# Patient Record
Sex: MALE | Race: WHITE | Employment: PART TIME | URBAN - METROPOLITAN AREA
[De-identification: names, ages, dates, MRNs, and addresses within clinical notes are randomized per-mention and may not be internally consistent; named-entity substitution may affect disease eponyms.]

---

## 2017-02-08 ENCOUNTER — HOSPITAL ENCOUNTER (OUTPATIENT)
Dept: LAB | Age: 22
Discharge: HOME | End: 2017-02-08

## 2017-02-08 LAB
HBV & HDV AB SER-IMP: NEGATIVE
HBV SURFACE AB SERPL IA-ACNC: <3.1 MIU/ML
HEP BS AB COMMENT,HBSAC: ABNORMAL
RUBV IGG SER-IMP: NORMAL
T-SPOT TB TEST (EMPLOYEE),XTBE: NORMAL

## 2017-02-08 PROCEDURE — 86762 RUBELLA ANTIBODY: CPT | Performed by: EMERGENCY MEDICINE

## 2017-02-08 PROCEDURE — 36415 COLL VENOUS BLD VENIPUNCTURE: CPT | Performed by: EMERGENCY MEDICINE

## 2017-02-08 PROCEDURE — 86787 VARICELLA-ZOSTER ANTIBODY: CPT | Performed by: EMERGENCY MEDICINE

## 2017-02-08 PROCEDURE — 86706 HEP B SURFACE ANTIBODY: CPT | Performed by: EMERGENCY MEDICINE

## 2017-02-08 PROCEDURE — 86765 RUBEOLA ANTIBODY: CPT | Performed by: EMERGENCY MEDICINE

## 2017-02-08 PROCEDURE — 86735 MUMPS ANTIBODY: CPT | Performed by: EMERGENCY MEDICINE

## 2017-02-09 LAB
MEV IGG SER IA-ACNC: 267 AU/ML
MUV IGG SER IA-ACNC: 66.1 AU/ML
VZV IGG SER IA-ACNC: 139 INDEX

## 2020-05-30 ENCOUNTER — HOSPITAL ENCOUNTER (EMERGENCY)
Age: 25
Discharge: HOME | End: 2020-05-30
Attending: EMERGENCY MEDICINE
Payer: COMMERCIAL

## 2020-05-30 ENCOUNTER — NURSE TRIAGE (OUTPATIENT)
Dept: OTHER | Facility: CLINIC | Age: 25
End: 2020-05-30

## 2020-05-30 VITALS
OXYGEN SATURATION: 97 % | HEIGHT: 71 IN | TEMPERATURE: 98.5 F | DIASTOLIC BLOOD PRESSURE: 91 MMHG | BODY MASS INDEX: 23.8 KG/M2 | HEART RATE: 89 BPM | RESPIRATION RATE: 16 BRPM | WEIGHT: 170 LBS | SYSTOLIC BLOOD PRESSURE: 149 MMHG

## 2020-05-30 DIAGNOSIS — H72.91 PERFORATED EAR DRUM, RIGHT: Primary | ICD-10-CM

## 2020-05-30 PROCEDURE — 99282 EMERGENCY DEPT VISIT SF MDM: CPT

## 2020-05-30 RX ORDER — OFLOXACIN 3 MG/ML
5 SOLUTION AURICULAR (OTIC) 2 TIMES DAILY
Qty: 5 ML | Refills: 0 | Status: SHIPPED | OUTPATIENT
Start: 2020-05-30 | End: 2020-06-04

## 2020-05-30 RX ORDER — TRAMADOL HYDROCHLORIDE 50 MG/1
50 TABLET ORAL
Qty: 20 TAB | Refills: 0 | Status: SHIPPED | OUTPATIENT
Start: 2020-05-30 | End: 2020-06-06

## 2020-05-30 NOTE — LETTER
NOTIFICATION RETURN TO WORK / SCHOOL 
 
5/30/2020 10:40 PM 
 
Mr. Jose M Young [address] [facility] [id number] To Whom It May Concern: 
 
Jose M Young is currently under the care of [facility] EMERGENCY DEPT. He will return to work/school on: 6/3/2020 unless he feels well earlier Jose M Young may return to work/school with the following restrictions: Cannot wear hearing protection If there are questions or concerns please have the patient contact our office. Sincerely, Liang Pompa MD

## 2020-05-31 NOTE — DISCHARGE INSTRUCTIONS
Patient Education        Perforated Eardrum: Care Instructions  Your Care Instructions     A tear or hole in the membrane of the middle ear is called a perforated or ruptured eardrum. This can happen if an infection builds up inside the ear or if the eardrum gets injured. You may find it hard to hear out of that ear or may hear a buzzing sound. You may have an earache or have fluids that drain from the ear. Your eardrum should heal on its own in a few weeks, and you should hear normally then. If you have an infection, your doctor may prescribe antibiotics. You may need pain relief medicine for your earache. Your doctor will check to see if your eardrum has healed. If not, you may need surgery to repair the eardrum. Follow-up care is a key part of your treatment and safety. Be sure to make and go to all appointments, and call your doctor if you are having problems. It's also a good idea to know your test results and keep a list of the medicines you take. How can you care for yourself at home? · If your doctor prescribed antibiotics, take them as directed. Do not stop taking them just because you feel better. You need to take the full course of antibiotics. · Take an over-the-counter pain medicine, such as acetaminophen (Tylenol), ibuprofen (Advil, Motrin), or naproxen (Aleve), as needed. Read and follow all instructions on the label. · Do not take two or more pain medicines at the same time unless the doctor told you to. Many pain medicines have acetaminophen, which is Tylenol. Too much acetaminophen (Tylenol) can be harmful. · To ease pain, put a warm washcloth or a heating pad set on low on your ear. You may have some drainage from the ear. · Be careful when taking over-the-counter cold or flu medicines and Tylenol at the same time. Many of these medicines have acetaminophen, which is Tylenol. Read the labels to make sure that you are not taking more than the recommended dose.  Too much Tylenol can be harmful. · Keep your ears dry. ? Take baths until your doctor says you can take showers again. ? When you wash your hair, use cotton lightly coated with petroleum jelly as an earplug. Do not use plastic earplugs. ? Do not swim until your doctor says you can.  ? If you get water in your ears, turn your head to each side and pull the earlobe in different directions. This will help the water run out. If your ears are still wet, use a hair dryer set on the lowest heat. Hold the dryer several inches from your ear. · Do not put anything into your ear canal. For example, do not use a cotton swab to clean the inside of your ear. It can damage your ear. If you think you have something inside your ear, ask your doctor to check it. When should you call for help? Call your doctor now or seek immediate medical care if:  · You have signs of infection, such as:  ? Increased pain, swelling, warmth, or redness. ? Pus draining from the ear. ? A fever. Watch closely for changes in your health, and be sure to contact your doctor if:  · You have changes in hearing. · You do not get better as expected. Where can you learn more? Go to http://jeny-lilly.info/  Enter O822 in the search box to learn more about \"Perforated Eardrum: Care Instructions. \"  Current as of: July 29, 2019               Content Version: 12.5  © 2445-2231 [employer]. Care instructions adapted under license by [employer] (which disclaims liability or warranty for this information). If you have questions about a medical condition or this instruction, always ask your healthcare professional. [place] [address] any warranty or liability for your use of this information.

## 2020-05-31 NOTE — TELEPHONE ENCOUNTER
Was swimming in the deep end of a pool, suddenly felt a tearing pain to ear, 2-3/10 initially, now 8/10 with no hearing in R ear. Dispo for Go to ED due to severe pain and total loss of hearing in the Right ear. Reason for Disposition   Sounds like a serious injury to the triager    Answer Assessment - Initial Assessment Questions  1. MECHANISM: \"How did the injury happen? \"       Swimming in deep end of swimming pool  2. ONSET: \"When did the injury happen? \" (Minutes or hours ago)       30 mins ago  3. LOCATION: \"What part of the ear is injured? \"       Right inner ear  4. APPEARANCE: \"What does the ear look like? \"       Normal external appearance  5. HEARING: \"Was the hearing damaged? \"       yes  6. SIZE: For cuts, bruises, or swelling, ask: \"How large is it? \" (e.g., inches or centimeters)      no  7. PAIN: \"Is it painful? \" If so, ask: \"How bad is the pain? \"    (e.g., Scale 1-10; or mild, moderate, severe)      severe  8. TETANUS: For any breaks in the skin, ask: \"When was the last tetanus booster? \"      3 years ago  9. OTHER SYMPTOMS: \"Do you have any other symptoms? \" (e.g., neck pain, headache, loss of consciousness)      Headache  10. PREGNANCY: \"Is there any chance you are pregnant? \" \"When was your last menstrual period? \"        male    Protocols used: EAR INJURY-ADULT-

## 2020-05-31 NOTE — ED NOTES
Patient was swimming and felt pressure on right ear and then tearing sensation. After coming out of water patient felt dizzy. States pain is worse than any he has experienced.

## 2020-05-31 NOTE — ED PROVIDER NOTES
EMERGENCY DEPARTMENT HISTORY AND PHYSICAL EXAM      Date: 5/30/2020  Patient Name: Bella Barron    History of Presenting Illness     Chief Complaint   Patient presents with    Ear Pain       History (Context): Bella Barron is a 22 y.o. previously healthy gentleman who presents with acute onset, persistent, severe, localized pain in the right ear experienced after diving in approximately 10 to 15 feet of water. On review of systems, the patient denies fever, chills, rashes,     PCP: Val León MD        Past History     Past Medical History:  History reviewed. No pertinent past medical history. Past Surgical History:  Past Surgical History:   Procedure Laterality Date    HX HEENT      HX TYMPANOSTOMY         Family History:  History reviewed. No pertinent family history. Social History:  Social History     Tobacco Use    Smoking status: Current Every Day Smoker    Smokeless tobacco: Never Used   Substance Use Topics    Alcohol use: Yes    Drug use: Never       Allergies:  No Known Allergies    PMH, PSH, family history, social history, allergies reviewed with the patient with significant items noted above. Review of Systems   As per HPI, otherwise reviewed and negative. Physical Exam     Vitals:    05/30/20 2222   BP: (!) 149/91   Pulse: 89   Resp: 16   Temp: 98.5 °F (36.9 °C)   SpO2: 97%   Weight: 77.1 kg (170 lb)   Height: 5' 11\" (1.803 m)       Gen: Well-appearing, in no acute distress   HEENT: Normocephalic, sclera anicteric, small perforation at 3 o'clock position in the right TM  Cardiovascular: Normal rate, regular rhythm, no murmurs, rubs, gallops. Pulses intact and equal distally. Pulmonary: No respiratory distress. No stridor. Clear lungs. ABD: Soft, nontender, nondistended. Neuro: Alert. Normal speech. Normal mentation. Psych: Normal thought content and thought processes. : No CVA tenderness  EXT: Moves all extremities well.   No cyanosis or clubbing. Skin: Warm and well-perfused. Other:        Diagnostic Study Results     Labs -   No results found for this or any previous visit (from the past 12 hour(s)). Radiologic Studies -   No orders to display     CT Results  (Last 48 hours)    None        CXR Results  (Last 48 hours)    None            Medical Decision Making   I am the first provider for this patient. I reviewed the vital signs, available nursing notes, past medical history, past surgical history, family history and social history. Vital Signs-Reviewed the patient's vital signs. EKG: Interpreted by myself. Records Reviewed: Personally, on initial evaluation    MDM:   Patient presents with right ear pain while diving. Exam significant for perforated TM. DDX considered: Perforated TM  DDX thought to be less likely but also considered due to high risk condition: Otitis externa, otitis media, labyrinthitis    Patient condition on initial evaluation: Stable    Plan:   Pain Control      Orders as below:  Orders Placed This Encounter    ofloxacin (FLOXIN) 0.3 % otic solution    traMADoL (Ultram) 50 mg tablet        ED Course:          Patient condition at time of disposition: Stable  DISCHARGE NOTE:   Pt has been reexamined. Patient has no new complaints, changes, or physical findings. Care plan outlined and precautions discussed. Results were reviewed with the patient. All medications were reviewed with the patient; will d/c home with antibiotic drops. All of pt's questions and concerns were addressed. Alarm symptoms and return precautions associated with chief complaint and evaluation were reviewed with the patient in detail. The patient demonstrated adequate understanding. Patient was instructed and agrees to follow up with PCP, as well as to return to the ED upon further deterioration. Patient is ready to go home.   The patient is happy with this plan    Follow-up Information     Follow up With Specialties Details Why [address] [facility] EMERGENCY DEPT Emergency Medicine  As needed, If symptoms worsen [address] [facility] [address] [facility] [address].  [phone]          Discharge Medication List as of 5/30/2020 10:39 PM      START taking these medications    Details   ofloxacin (FLOXIN) 0.3 % otic solution Administer 5 Drops in right ear two (2) times a day for 5 days. , Print, Disp-5 mL, R-0      traMADoL (Ultram) 50 mg tablet Take 1 Tab by mouth every six (6) hours as needed for Pain for up to 7 days. Max Daily Amount: 200 mg., Print, Disp-20 Tab, R-0             Procedures:  Procedures      Critical Care Time:     Disposition: Discharge home    Diagnosis     Clinical Impression:   1. Perforated ear drum, right        Signed,  Camille Moreno MD  Emergency Physician  [facility]    As a voice dictation software was utilized to dictate this note, minor word transpositions can occur. I apologize for confusing wording and typographic errors. Please feel free to contact me for clarification.

## 2023-02-20 ENCOUNTER — HOSPITAL ENCOUNTER (OUTPATIENT)
Facility: HOSPITAL | Age: 28
Setting detail: SPECIMEN
Discharge: HOME | End: 2023-02-23

## 2023-02-20 PROCEDURE — 86706 HEP B SURFACE ANTIBODY: CPT

## 2023-02-20 PROCEDURE — 87389 HIV-1 AG W/HIV-1&-2 AB AG IA: CPT

## 2023-02-20 PROCEDURE — 86803 HEPATITIS C AB TEST: CPT

## 2023-02-21 LAB
HBV SURFACE AB SER QL IA: NEGATIVE
HBV SURFACE AB SERPL IA-ACNC: 3.4 MIU/ML
HCV AB SER IA-ACNC: <0.02 INDEX
HCV AB SERPL QL IA: NEGATIVE
Lab: ABNORMAL
Lab: NORMAL

## 2023-02-22 LAB
HIV 1+2 AB+HIV1 P24 AG SERPL QL IA: NONREACTIVE
HIV 1/2 RESULT COMMENT: NORMAL

## 2025-05-01 ENCOUNTER — TRANSCRIBE ORDERS (OUTPATIENT)
Facility: HOSPITAL | Age: 30
End: 2025-05-01

## 2025-05-01 DIAGNOSIS — R06.09 OTHER FORM OF DYSPNEA: ICD-10-CM

## 2025-05-01 DIAGNOSIS — R00.2 PALPITATIONS: Primary | ICD-10-CM

## 2025-05-08 ENCOUNTER — HOSPITAL ENCOUNTER (OUTPATIENT)
Facility: HOSPITAL | Age: 30
Discharge: HOME | End: 2025-05-10
Attending: INTERNAL MEDICINE
Payer: COMMERCIAL

## 2025-05-08 DIAGNOSIS — R00.2 PALPITATIONS: ICD-10-CM

## 2025-05-08 DIAGNOSIS — R06.09 OTHER FORM OF DYSPNEA: ICD-10-CM

## 2025-05-08 LAB
EKG DIAGNOSIS: NORMAL
STRESS BASELINE DIAS BP: 79 MMHG
STRESS BASELINE HR: 69 BPM
STRESS BASELINE ST DEPRESSION: 0 MM
STRESS BASELINE SYS BP: 121 MMHG
STRESS ESTIMATED WORKLOAD: 11.7 METS
STRESS EXERCISE DUR MIN: 10 MIN
STRESS EXERCISE DUR SEC: 1 SEC
STRESS PEAK DIAS BP: 78 MMHG
STRESS PEAK SYS BP: 163 MMHG
STRESS PERCENT HR ACHIEVED: 89 %
STRESS POST PEAK HR: 171 BPM
STRESS RATE PRESSURE PRODUCT: NORMAL BPM*MMHG
STRESS ST DEPRESSION: 0 MM
STRESS TARGET HR: 192 BPM

## 2025-05-08 PROCEDURE — 93017 CV STRESS TEST TRACING ONLY: CPT
